# Patient Record
Sex: MALE | Race: WHITE | URBAN - METROPOLITAN AREA
[De-identification: names, ages, dates, MRNs, and addresses within clinical notes are randomized per-mention and may not be internally consistent; named-entity substitution may affect disease eponyms.]

---

## 2018-02-02 ENCOUNTER — OUTPATIENT (OUTPATIENT)
Dept: OUTPATIENT SERVICES | Facility: HOSPITAL | Age: 53
LOS: 1 days | Discharge: HOME | End: 2018-02-02

## 2018-02-02 DIAGNOSIS — Z02.9 ENCOUNTER FOR ADMINISTRATIVE EXAMINATIONS, UNSPECIFIED: ICD-10-CM

## 2018-02-02 DIAGNOSIS — E11.9 TYPE 2 DIABETES MELLITUS WITHOUT COMPLICATIONS: ICD-10-CM

## 2018-02-02 DIAGNOSIS — E78.5 HYPERLIPIDEMIA, UNSPECIFIED: ICD-10-CM

## 2018-02-02 DIAGNOSIS — D64.9 ANEMIA, UNSPECIFIED: ICD-10-CM

## 2018-02-02 DIAGNOSIS — I10 ESSENTIAL (PRIMARY) HYPERTENSION: ICD-10-CM

## 2018-02-02 DIAGNOSIS — E83.52 HYPERCALCEMIA: ICD-10-CM

## 2018-02-07 ENCOUNTER — OUTPATIENT (OUTPATIENT)
Dept: OUTPATIENT SERVICES | Facility: HOSPITAL | Age: 53
LOS: 1 days | Discharge: HOME | End: 2018-02-07

## 2018-02-07 DIAGNOSIS — E78.5 HYPERLIPIDEMIA, UNSPECIFIED: ICD-10-CM

## 2018-02-07 DIAGNOSIS — I10 ESSENTIAL (PRIMARY) HYPERTENSION: ICD-10-CM

## 2018-09-14 ENCOUNTER — OUTPATIENT (OUTPATIENT)
Dept: OUTPATIENT SERVICES | Facility: HOSPITAL | Age: 53
LOS: 1 days | Discharge: HOME | End: 2018-09-14

## 2018-09-14 DIAGNOSIS — I10 ESSENTIAL (PRIMARY) HYPERTENSION: ICD-10-CM

## 2018-09-14 DIAGNOSIS — D64.9 ANEMIA, UNSPECIFIED: ICD-10-CM

## 2018-09-14 DIAGNOSIS — E11.9 TYPE 2 DIABETES MELLITUS WITHOUT COMPLICATIONS: ICD-10-CM

## 2020-08-12 PROBLEM — Z00.00 ENCOUNTER FOR PREVENTIVE HEALTH EXAMINATION: Status: ACTIVE | Noted: 2020-08-12

## 2020-10-23 ENCOUNTER — APPOINTMENT (OUTPATIENT)
Dept: ORTHOPEDIC SURGERY | Facility: CLINIC | Age: 55
End: 2020-10-23
Payer: COMMERCIAL

## 2020-10-23 VITALS — HEIGHT: 73 IN | BODY MASS INDEX: 39.76 KG/M2 | WEIGHT: 300 LBS

## 2020-10-23 DIAGNOSIS — M17.12 UNILATERAL PRIMARY OSTEOARTHRITIS, LEFT KNEE: ICD-10-CM

## 2020-10-23 DIAGNOSIS — Z86.39 PERSONAL HISTORY OF OTHER ENDOCRINE, NUTRITIONAL AND METABOLIC DISEASE: ICD-10-CM

## 2020-10-23 DIAGNOSIS — M17.11 UNILATERAL PRIMARY OSTEOARTHRITIS, RIGHT KNEE: ICD-10-CM

## 2020-10-23 DIAGNOSIS — Z80.0 FAMILY HISTORY OF MALIGNANT NEOPLASM OF DIGESTIVE ORGANS: ICD-10-CM

## 2020-10-23 DIAGNOSIS — Z86.69 PERSONAL HISTORY OF OTHER DISEASES OF THE NERVOUS SYSTEM AND SENSE ORGANS: ICD-10-CM

## 2020-10-23 DIAGNOSIS — Z96.642 PRESENCE OF LEFT ARTIFICIAL HIP JOINT: ICD-10-CM

## 2020-10-23 DIAGNOSIS — M25.551 PAIN IN RIGHT HIP: ICD-10-CM

## 2020-10-23 DIAGNOSIS — M25.561 PAIN IN RIGHT KNEE: ICD-10-CM

## 2020-10-23 DIAGNOSIS — Z86.79 PERSONAL HISTORY OF OTHER DISEASES OF THE CIRCULATORY SYSTEM: ICD-10-CM

## 2020-10-23 PROCEDURE — 99072 ADDL SUPL MATRL&STAF TM PHE: CPT

## 2020-10-23 PROCEDURE — 73564 X-RAY EXAM KNEE 4 OR MORE: CPT | Mod: RT

## 2020-10-23 PROCEDURE — 73521 X-RAY EXAM HIPS BI 2 VIEWS: CPT

## 2020-10-23 PROCEDURE — 99203 OFFICE O/P NEW LOW 30 MIN: CPT

## 2020-10-23 RX ORDER — GLIPIZIDE AND METFORMIN HYDROCHLORIDE 2.5; 5 MG/1; MG/1
2.5-5 TABLET, FILM COATED ORAL
Refills: 0 | Status: ACTIVE | COMMUNITY

## 2020-10-23 RX ORDER — LOSARTAN POTASSIUM 100 MG/1
TABLET, FILM COATED ORAL
Refills: 0 | Status: ACTIVE | COMMUNITY

## 2020-10-23 RX ORDER — CELECOXIB 200 MG/1
200 CAPSULE ORAL
Refills: 0 | Status: ACTIVE | COMMUNITY

## 2020-10-23 RX ORDER — FAMOTIDINE 40 MG/1
40 TABLET, FILM COATED ORAL
Refills: 0 | Status: ACTIVE | COMMUNITY

## 2020-10-23 NOTE — PHYSICAL EXAM
[de-identified] : General appearance: well nourished and hydrated, pleasant, alert and oriented x 3, cooperative.\par Cardiovascular: no apparent abnormalities, no lower leg edema, no varicosities, pedal pulses are palpable.\par Neurologic: sensation is normal, no muscle weakness in upper or lower extremities\par Dermatologic no apparent skin lesions, moist, warm, no rash.\par Gait: nonantalgic.\par \par Left knee\par Inspection: no effusion or erythema.\par Wounds: none.\par Alignment: varus\par Palpation: medial joint line tenderness \par ROM:10 degrees varus, 0-95 degrees \par Ligamentous laxity: all ligaments appear stable\par Muscle Test: good quad strength.\par \par Right knee\par Inspection: no effusion or erythema.\par Wounds: none.\par Alignment: varus\par Palpation: medial joint line tenderness \par ROM: 10 degrees varus, 0-95 degrees \par Ligamentous laxity: all ligaments appear stable\par Muscle Test: good quad strength.\par  [de-identified] : Radiographs done today AP lateral and skyline of both knees shows bone on bone in the medial compartments of both knees with severe varus deformities bilaterally. \par \par Radiographs done today AP pelvis and lateral of both hips shows a well aligned cementless total hip on the left and well maintained joint space on the right.

## 2020-10-23 NOTE — HISTORY OF PRESENT ILLNESS
[de-identified] : 55 year old male s/p left total hip replacement in 2017 with another orthopedic surgeon presents to the office today complaining of right hip pain and bilateral knee pain, right knee more painful than the left. In regard to his left total hip replacement, patient reports doing well. In regard to the right hip, patient reports pain that is sharp in nature. he denies any groin pain and states the pain is in the lower back and radiates down posteriorly. Patient reports having the same pain prior to having his left hip replaced. In regard to the knees, patient reports right knee pain that is sharp in nature. He reports swelling, buckling, clicking, and a loss of motion. Patient reports only being able to ambulate about 2 blocks. He reports pain and difficulty with negotiating stairs, worse with ascending. He also notes increased difficulty putting socks and shoes on and standing from a seated position. Patient reports taking Celebrex 200 mg with only minimal relief. He has received gel injections in the past in the bilateral knees several years ago, but denies any relief. Patient is here today to discuss his options for pain relief.

## 2020-10-23 NOTE — ASSESSMENT
[FreeTextEntry1] : 55 year old male with a BMI of 40, s/p LTH with Dr Youssef 2017. He presents for bilateral knee pain (right worse than left) and right hip pain. His hip radiographs show minimal arthritis but he has severe varus deformities and severe arthritis of both knees. The pt desires elective bilateral total knee replacements. His mother is demented and non ambulatory and he is her only care giver, he has no family to help him. For now he will just continue on Celebrex. I feel his arthritis is too severe to warrant injections. He was also counseled on weight loss. He will f/u PRN

## 2021-02-25 ENCOUNTER — APPOINTMENT (OUTPATIENT)
Dept: OTOLARYNGOLOGY | Facility: CLINIC | Age: 56
End: 2021-02-25

## 2022-07-24 ENCOUNTER — NON-APPOINTMENT (OUTPATIENT)
Age: 57
End: 2022-07-24

## 2022-11-27 ENCOUNTER — NON-APPOINTMENT (OUTPATIENT)
Age: 57
End: 2022-11-27

## 2023-12-21 ENCOUNTER — EMERGENCY (EMERGENCY)
Facility: HOSPITAL | Age: 58
LOS: 0 days | Discharge: LEFT BEFORE DISCHARGE | End: 2023-12-21
Attending: STUDENT IN AN ORGANIZED HEALTH CARE EDUCATION/TRAINING PROGRAM
Payer: COMMERCIAL

## 2023-12-21 VITALS
WEIGHT: 274.92 LBS | TEMPERATURE: 98 F | DIASTOLIC BLOOD PRESSURE: 66 MMHG | HEART RATE: 102 BPM | RESPIRATION RATE: 18 BRPM | SYSTOLIC BLOOD PRESSURE: 112 MMHG | OXYGEN SATURATION: 94 %

## 2023-12-21 DIAGNOSIS — E78.5 HYPERLIPIDEMIA, UNSPECIFIED: ICD-10-CM

## 2023-12-21 DIAGNOSIS — E11.9 TYPE 2 DIABETES MELLITUS WITHOUT COMPLICATIONS: ICD-10-CM

## 2023-12-21 DIAGNOSIS — Z23 ENCOUNTER FOR IMMUNIZATION: ICD-10-CM
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
## 2023-12-21 DIAGNOSIS — I10 ESSENTIAL (PRIMARY) HYPERTENSION: ICD-10-CM
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
## 2023-12-21 DIAGNOSIS — S01.01XA LACERATION WITHOUT FOREIGN BODY OF SCALP, INITIAL ENCOUNTER: ICD-10-CM

## 2023-12-21 DIAGNOSIS — Z53.29 PROCEDURE AND TREATMENT NOT CARRIED OUT BECAUSE OF PATIENT'S DECISION FOR OTHER REASONS: ICD-10-CM

## 2023-12-21 LAB
ALBUMIN SERPL ELPH-MCNC: 4.4 G/DL — SIGNIFICANT CHANGE UP (ref 3.5–5.2)
ALBUMIN SERPL ELPH-MCNC: 4.4 G/DL — SIGNIFICANT CHANGE UP (ref 3.5–5.2)
ALP SERPL-CCNC: 79 U/L — SIGNIFICANT CHANGE UP (ref 30–115)
ALP SERPL-CCNC: 79 U/L — SIGNIFICANT CHANGE UP (ref 30–115)
ALT FLD-CCNC: 16 U/L — SIGNIFICANT CHANGE UP (ref 0–41)
ALT FLD-CCNC: 16 U/L — SIGNIFICANT CHANGE UP (ref 0–41)
ANION GAP SERPL CALC-SCNC: 16 MMOL/L — HIGH (ref 7–14)
ANION GAP SERPL CALC-SCNC: 16 MMOL/L — HIGH (ref 7–14)
AST SERPL-CCNC: 16 U/L — SIGNIFICANT CHANGE UP (ref 0–41)
AST SERPL-CCNC: 16 U/L — SIGNIFICANT CHANGE UP (ref 0–41)
BASOPHILS # BLD AUTO: 0.05 K/UL — SIGNIFICANT CHANGE UP (ref 0–0.2)
BASOPHILS # BLD AUTO: 0.05 K/UL — SIGNIFICANT CHANGE UP (ref 0–0.2)
BASOPHILS NFR BLD AUTO: 0.5 % — SIGNIFICANT CHANGE UP (ref 0–1)
BASOPHILS NFR BLD AUTO: 0.5 % — SIGNIFICANT CHANGE UP (ref 0–1)
BILIRUB SERPL-MCNC: 0.3 MG/DL — SIGNIFICANT CHANGE UP (ref 0.2–1.2)
BILIRUB SERPL-MCNC: 0.3 MG/DL — SIGNIFICANT CHANGE UP (ref 0.2–1.2)
BUN SERPL-MCNC: 15 MG/DL — SIGNIFICANT CHANGE UP (ref 10–20)
BUN SERPL-MCNC: 15 MG/DL — SIGNIFICANT CHANGE UP (ref 10–20)
CALCIUM SERPL-MCNC: 9.8 MG/DL — SIGNIFICANT CHANGE UP (ref 8.4–10.5)
CALCIUM SERPL-MCNC: 9.8 MG/DL — SIGNIFICANT CHANGE UP (ref 8.4–10.5)
CHLORIDE SERPL-SCNC: 96 MMOL/L — LOW (ref 98–110)
CHLORIDE SERPL-SCNC: 96 MMOL/L — LOW (ref 98–110)
CO2 SERPL-SCNC: 21 MMOL/L — SIGNIFICANT CHANGE UP (ref 17–32)
CO2 SERPL-SCNC: 21 MMOL/L — SIGNIFICANT CHANGE UP (ref 17–32)
CREAT SERPL-MCNC: 0.7 MG/DL — SIGNIFICANT CHANGE UP (ref 0.7–1.5)
CREAT SERPL-MCNC: 0.7 MG/DL — SIGNIFICANT CHANGE UP (ref 0.7–1.5)
EGFR: 105 ML/MIN/1.73M2 — SIGNIFICANT CHANGE UP
EGFR: 105 ML/MIN/1.73M2 — SIGNIFICANT CHANGE UP
EOSINOPHIL # BLD AUTO: 0.31 K/UL — SIGNIFICANT CHANGE UP (ref 0–0.7)
EOSINOPHIL # BLD AUTO: 0.31 K/UL — SIGNIFICANT CHANGE UP (ref 0–0.7)
EOSINOPHIL NFR BLD AUTO: 3 % — SIGNIFICANT CHANGE UP (ref 0–8)
EOSINOPHIL NFR BLD AUTO: 3 % — SIGNIFICANT CHANGE UP (ref 0–8)
GLUCOSE SERPL-MCNC: 154 MG/DL — HIGH (ref 70–99)
GLUCOSE SERPL-MCNC: 154 MG/DL — HIGH (ref 70–99)
HCT VFR BLD CALC: 45.4 % — SIGNIFICANT CHANGE UP (ref 42–52)
HCT VFR BLD CALC: 45.4 % — SIGNIFICANT CHANGE UP (ref 42–52)
HGB BLD-MCNC: 15.6 G/DL — SIGNIFICANT CHANGE UP (ref 14–18)
HGB BLD-MCNC: 15.6 G/DL — SIGNIFICANT CHANGE UP (ref 14–18)
IMM GRANULOCYTES NFR BLD AUTO: 1 % — HIGH (ref 0.1–0.3)
IMM GRANULOCYTES NFR BLD AUTO: 1 % — HIGH (ref 0.1–0.3)
LACTATE SERPL-SCNC: 2.1 MMOL/L — HIGH (ref 0.7–2)
LACTATE SERPL-SCNC: 2.1 MMOL/L — HIGH (ref 0.7–2)
LIDOCAIN IGE QN: 53 U/L — SIGNIFICANT CHANGE UP (ref 7–60)
LIDOCAIN IGE QN: 53 U/L — SIGNIFICANT CHANGE UP (ref 7–60)
LYMPHOCYTES # BLD AUTO: 3.23 K/UL — SIGNIFICANT CHANGE UP (ref 1.2–3.4)
LYMPHOCYTES # BLD AUTO: 3.23 K/UL — SIGNIFICANT CHANGE UP (ref 1.2–3.4)
LYMPHOCYTES # BLD AUTO: 30.8 % — SIGNIFICANT CHANGE UP (ref 20.5–51.1)
LYMPHOCYTES # BLD AUTO: 30.8 % — SIGNIFICANT CHANGE UP (ref 20.5–51.1)
MCHC RBC-ENTMCNC: 31.3 PG — HIGH (ref 27–31)
MCHC RBC-ENTMCNC: 31.3 PG — HIGH (ref 27–31)
MCHC RBC-ENTMCNC: 34.4 G/DL — SIGNIFICANT CHANGE UP (ref 32–37)
MCHC RBC-ENTMCNC: 34.4 G/DL — SIGNIFICANT CHANGE UP (ref 32–37)
MCV RBC AUTO: 91.2 FL — SIGNIFICANT CHANGE UP (ref 80–94)
MCV RBC AUTO: 91.2 FL — SIGNIFICANT CHANGE UP (ref 80–94)
MONOCYTES # BLD AUTO: 0.78 K/UL — HIGH (ref 0.1–0.6)
MONOCYTES # BLD AUTO: 0.78 K/UL — HIGH (ref 0.1–0.6)
MONOCYTES NFR BLD AUTO: 7.4 % — SIGNIFICANT CHANGE UP (ref 1.7–9.3)
MONOCYTES NFR BLD AUTO: 7.4 % — SIGNIFICANT CHANGE UP (ref 1.7–9.3)
NEUTROPHILS # BLD AUTO: 6.02 K/UL — SIGNIFICANT CHANGE UP (ref 1.4–6.5)
NEUTROPHILS # BLD AUTO: 6.02 K/UL — SIGNIFICANT CHANGE UP (ref 1.4–6.5)
NEUTROPHILS NFR BLD AUTO: 57.3 % — SIGNIFICANT CHANGE UP (ref 42.2–75.2)
NEUTROPHILS NFR BLD AUTO: 57.3 % — SIGNIFICANT CHANGE UP (ref 42.2–75.2)
NRBC # BLD: 0 /100 WBCS — SIGNIFICANT CHANGE UP (ref 0–0)
NRBC # BLD: 0 /100 WBCS — SIGNIFICANT CHANGE UP (ref 0–0)
PLATELET # BLD AUTO: 257 K/UL — SIGNIFICANT CHANGE UP (ref 130–400)
PLATELET # BLD AUTO: 257 K/UL — SIGNIFICANT CHANGE UP (ref 130–400)
PMV BLD: 9.9 FL — SIGNIFICANT CHANGE UP (ref 7.4–10.4)
PMV BLD: 9.9 FL — SIGNIFICANT CHANGE UP (ref 7.4–10.4)
POTASSIUM SERPL-MCNC: 4.3 MMOL/L — SIGNIFICANT CHANGE UP (ref 3.5–5)
POTASSIUM SERPL-MCNC: 4.3 MMOL/L — SIGNIFICANT CHANGE UP (ref 3.5–5)
POTASSIUM SERPL-SCNC: 4.3 MMOL/L — SIGNIFICANT CHANGE UP (ref 3.5–5)
POTASSIUM SERPL-SCNC: 4.3 MMOL/L — SIGNIFICANT CHANGE UP (ref 3.5–5)
PROT SERPL-MCNC: 7.2 G/DL — SIGNIFICANT CHANGE UP (ref 6–8)
PROT SERPL-MCNC: 7.2 G/DL — SIGNIFICANT CHANGE UP (ref 6–8)
RBC # BLD: 4.98 M/UL — SIGNIFICANT CHANGE UP (ref 4.7–6.1)
RBC # BLD: 4.98 M/UL — SIGNIFICANT CHANGE UP (ref 4.7–6.1)
RBC # FLD: 13 % — SIGNIFICANT CHANGE UP (ref 11.5–14.5)
RBC # FLD: 13 % — SIGNIFICANT CHANGE UP (ref 11.5–14.5)
SODIUM SERPL-SCNC: 133 MMOL/L — LOW (ref 135–146)
SODIUM SERPL-SCNC: 133 MMOL/L — LOW (ref 135–146)
WBC # BLD: 10.49 K/UL — SIGNIFICANT CHANGE UP (ref 4.8–10.8)
WBC # BLD: 10.49 K/UL — SIGNIFICANT CHANGE UP (ref 4.8–10.8)
WBC # FLD AUTO: 10.49 K/UL — SIGNIFICANT CHANGE UP (ref 4.8–10.8)
WBC # FLD AUTO: 10.49 K/UL — SIGNIFICANT CHANGE UP (ref 4.8–10.8)

## 2023-12-21 PROCEDURE — 80053 COMPREHEN METABOLIC PANEL: CPT

## 2023-12-21 PROCEDURE — 12001 RPR S/N/AX/GEN/TRNK 2.5CM/<: CPT

## 2023-12-21 PROCEDURE — 72170 X-RAY EXAM OF PELVIS: CPT

## 2023-12-21 PROCEDURE — 71045 X-RAY EXAM CHEST 1 VIEW: CPT | Mod: 26

## 2023-12-21 PROCEDURE — 90715 TDAP VACCINE 7 YRS/> IM: CPT

## 2023-12-21 PROCEDURE — 72125 CT NECK SPINE W/O DYE: CPT | Mod: MA

## 2023-12-21 PROCEDURE — 36415 COLL VENOUS BLD VENIPUNCTURE: CPT

## 2023-12-21 PROCEDURE — 72125 CT NECK SPINE W/O DYE: CPT | Mod: 26,MA

## 2023-12-21 PROCEDURE — 72170 X-RAY EXAM OF PELVIS: CPT | Mod: 26

## 2023-12-21 PROCEDURE — 99284 EMERGENCY DEPT VISIT MOD MDM: CPT | Mod: 25

## 2023-12-21 PROCEDURE — 83690 ASSAY OF LIPASE: CPT

## 2023-12-21 PROCEDURE — 70450 CT HEAD/BRAIN W/O DYE: CPT | Mod: MA

## 2023-12-21 PROCEDURE — 71260 CT THORAX DX C+: CPT | Mod: MA

## 2023-12-21 PROCEDURE — 74177 CT ABD & PELVIS W/CONTRAST: CPT | Mod: MA

## 2023-12-21 PROCEDURE — 82962 GLUCOSE BLOOD TEST: CPT

## 2023-12-21 PROCEDURE — 71260 CT THORAX DX C+: CPT | Mod: 26,MA

## 2023-12-21 PROCEDURE — 70450 CT HEAD/BRAIN W/O DYE: CPT | Mod: 26,MA

## 2023-12-21 PROCEDURE — 74177 CT ABD & PELVIS W/CONTRAST: CPT | Mod: 26,MA

## 2023-12-21 PROCEDURE — 90471 IMMUNIZATION ADMIN: CPT

## 2023-12-21 PROCEDURE — 71045 X-RAY EXAM CHEST 1 VIEW: CPT

## 2023-12-21 PROCEDURE — 85025 COMPLETE CBC W/AUTO DIFF WBC: CPT

## 2023-12-21 PROCEDURE — 99285 EMERGENCY DEPT VISIT HI MDM: CPT | Mod: 25

## 2023-12-21 PROCEDURE — 83605 ASSAY OF LACTIC ACID: CPT

## 2023-12-21 RX ORDER — TETANUS TOXOID, REDUCED DIPHTHERIA TOXOID AND ACELLULAR PERTUSSIS VACCINE, ADSORBED 5; 2.5; 8; 8; 2.5 [IU]/.5ML; [IU]/.5ML; UG/.5ML; UG/.5ML; UG/.5ML
0.5 SUSPENSION INTRAMUSCULAR ONCE
Refills: 0 | Status: COMPLETED | OUTPATIENT
Start: 2023-12-21 | End: 2023-12-21

## 2023-12-21 RX ORDER — ACETAMINOPHEN 500 MG
650 TABLET ORAL ONCE
Refills: 0 | Status: COMPLETED | OUTPATIENT
Start: 2023-12-21 | End: 2023-12-21

## 2023-12-21 RX ADMIN — TETANUS TOXOID, REDUCED DIPHTHERIA TOXOID AND ACELLULAR PERTUSSIS VACCINE, ADSORBED 0.5 MILLILITER(S): 5; 2.5; 8; 8; 2.5 SUSPENSION INTRAMUSCULAR at 06:14

## 2023-12-21 RX ADMIN — Medication 650 MILLIGRAM(S): at 06:15

## 2023-12-21 NOTE — ED ADULT TRIAGE NOTE - CHIEF COMPLAINT QUOTE
pt complains of head injury s/p mechanical trip and fall down 4-5 wooden steps, pt states he thinks he lost conciousness for a few seconds, denies blood thinners

## 2023-12-21 NOTE — ED PROVIDER NOTE - PROGRESS NOTE DETAILS
58-year-old male who presents status post fall unknown LOC small laceration to the left  parietal area.  Imaging labs pain management Tdap reassess dispo pending ER: pt signed out to Dr. Flor Authored by Maria C Flor, DO: Patient aggressive and verbally abusive towards staff. Patient unable to be de-escalated, ripped out his IV and walked out of the Emergency Room.    Prior to this, patient updated will all available results. Aware of infrarenal aneurysm (ct chest and abdomen pelvis results only back at this time), still pending rest of workup. 2 staples placed and patient advised to return in 7 days for removal.

## 2023-12-21 NOTE — ED ADULT TRIAGE NOTE - ISOLATION TYPE:
None home w/ home PT/home w/ assist/pending course/improvement during inpatient stay, pending stair training.  Pt with multiple co-morbidities

## 2023-12-21 NOTE — ED PROVIDER NOTE - PHYSICAL EXAMINATION
VITAL SIGNS: I have reviewed nursing notes and confirm.  CONSTITUTIONAL: non-toxic, well appearing  SKIN: no rash, no petechiae.  EYES:  EOMI, pink conjunctiva, anicteric  ENT: tongue midline, no exudates, MMM. there is a 1-2 cm laceration noted to the R occipital area, no active bleeding.   NECK: Supple; no meningismus, no JVD  CARD: RRR, no murmurs, equal radial pulses bilaterally 2+  RESP: CTAB, no respiratory distress  ABD: Soft, non-tender, non-distended, no peritoneal signs, no HSM, no CVA tenderness  EXT: Normal ROM x4. No edema. No calves tenderness  NEURO: Alert, oriented x3. CN2-12 intact, equal strength bilaterally, nl gait.  PSYCH: Cooperative, appropriate.

## 2023-12-21 NOTE — ED PROCEDURE NOTE - CPROC ED TIME OUT STATEMENT1
“Patient's name, , procedure and correct site were confirmed during the [place] Timeout.” “Patient's name, , procedure and correct site were confirmed during the [place] Timeout.”

## 2023-12-21 NOTE — ED PROVIDER NOTE - NS ED ATTENDING STATEMENT MOD
You are seen after your exposure to antifreeze.  I discussed the case with [place] poison control who stated that it is highly unlikely he would receive a toxic exposure from your splash so he can rest assured.  This likely provoked some anxiety as your work-up is otherwise reassuring.  On to follow-up with your primary care provider as an outpatient if any of her symptoms worsen prior please return to the emergency department immediately.    Your chest x-ray will be read later today by radiologist I do not see any pneumonia but I will follow-up the radiology read and I will call you if there is any need for any antibiotic prescriptions but I will not call you if it is negative and I expect it to be read as negative as I do not see any abnormalities.   Attending Only

## 2023-12-21 NOTE — ED PROVIDER NOTE - OBJECTIVE STATEMENT
58-year-old male with a past medical history significant for diabetes hyperlipidemia hypertension who presents status post fall.  Patient states that he was walking down a flight of stairs at his home and had a mechanical fall and tripped.  Patient did hit his head and is unsure if there was any LOC.  Patient denies any nausea vomiting change in vision or any other medical complaints.  Of note patient not on any anticoagulation.

## 2023-12-21 NOTE — ED PROCEDURE NOTE - ATTENDING CONTRIBUTION TO CARE
I was present for and supervised the key/critical aspects of the procedures performed during the care of the patient. 2.885

## 2023-12-21 NOTE — ED PROVIDER NOTE - CLINICAL SUMMARY MEDICAL DECISION MAKING FREE TEXT BOX
patient signed out to me from Dr. White. 57 yo M presented to ED s/p mechanical fall down steps this morning +HT no reported LOC. Labs were ordered and reviewed.  Imaging was ordered and reviewed by me. Pending CT head and neck results prior to patient eloping (see progress note for more details.).  Appropriate medications for patient's presenting complaints were ordered and effects were reassessed.  Patient's records (prior hospital, ED visit, and/or nursing home notes if available) were reviewed.  Additional history was obtained from EMS, family, and/or PCP (where available).  Escalation to admission/observation was considered. However patient eloped from ED. patient signed out to me from Dr. White. 59 yo M presented to ED s/p mechanical fall down steps this morning +HT no reported LOC. Labs were ordered and reviewed.  Imaging was ordered and reviewed by me. Pending CT head and neck results prior to patient eloping (see progress note for more details.).  Appropriate medications for patient's presenting complaints were ordered and effects were reassessed.  Patient's records (prior hospital, ED visit, and/or nursing home notes if available) were reviewed.  Additional history was obtained from EMS, family, and/or PCP (where available).  Escalation to admission/observation was considered. However patient eloped from ED.

## 2023-12-21 NOTE — ED ADULT NURSE NOTE - NSFALLRISKINTERV_ED_ALL_ED
Communicate fall risk and risk factors to all staff, patient, and family/Provide visual cue: yellow wristband, yellow gown, etc/Reinforce activity limits and safety measures with patient and family/Call bell, personal items and telephone in reach/Instruct patient to call for assistance before getting out of bed/chair/stretcher/Non-slip footwear applied when patient is off stretcher/[place] to call system/Physically safe environment - no spills, clutter or unnecessary equipment/Purposeful Proactive Rounding/Room/bathroom lighting operational, light cord in reach Communicate fall risk and risk factors to all staff, patient, and family/Provide visual cue: yellow wristband, yellow gown, etc/Reinforce activity limits and safety measures with patient and family/Call bell, personal items and telephone in reach/Instruct patient to call for assistance before getting out of bed/chair/stretcher/Non-slip footwear applied when patient is off stretcher/[place] to call system/Physically safe environment - no spills, clutter or unnecessary equipment/Purposeful Proactive Rounding/Room/bathroom lighting operational, light cord in reach

## 2023-12-28 ENCOUNTER — EMERGENCY (EMERGENCY)
Facility: HOSPITAL | Age: 58
LOS: 0 days | Discharge: HOME | End: 2023-12-28
Attending: EMERGENCY MEDICINE
Payer: COMMERCIAL

## 2023-12-28 VITALS
DIASTOLIC BLOOD PRESSURE: 73 MMHG | RESPIRATION RATE: 16 BRPM | TEMPERATURE: 99 F | HEART RATE: 97 BPM | SYSTOLIC BLOOD PRESSURE: 152 MMHG | OXYGEN SATURATION: 98 %

## 2023-12-28 DIAGNOSIS — S01.01XD LACERATION WITHOUT FOREIGN BODY OF SCALP, SUBSEQUENT ENCOUNTER: ICD-10-CM

## 2023-12-28 PROCEDURE — 99212 OFFICE O/P EST SF 10 MIN: CPT

## 2023-12-28 PROCEDURE — L9995: CPT

## 2023-12-28 NOTE — ED PROVIDER NOTE - PHYSICAL EXAMINATION
_  CONSTITUTIONAL: In no acute distress  SKIN: Warm, dry; +well-healed laceration on the left parietal scalp with 2 staples in place  HEAD: NCAT  EYES: Clear conjunctiva   ENT: Moist mucous membranes  NECK: Supple  CARD: Regular rate and rhythm, no cyanosis  ABD: Soft, nontender  EXT: Pulses palpable distally  NEURO: Awake, alert  PSYCH: Cooperative, appropriate

## 2023-12-28 NOTE — ED PROVIDER NOTE - PROGRESS NOTE DETAILS
Resident DAVON Chambers: [place] removed. Patient asking for lab results from last visit, as he left prior to receiving discharge paperwork - copies provided. Resident DAVON Chambers: [place] removed. Patient asking for lab results from last visit, as he left prior to receiving discharge paperwork - copies provided.

## 2023-12-28 NOTE — ED PROVIDER NOTE - ATTENDING CONTRIBUTION TO CARE
58-year-old male PMH DM, HTN, HLD presenting for staple removal from his scalp placed on 12/21/2023.  He denies any complaints.  Patient appears well, wound is healing well, no signs of infection.  Staples were removed without complications.  Vital signs were reviewed, wound care discussed.  Patient stable for discharge home, he verbalized understanding is amenable to discharge plan.

## 2023-12-28 NOTE — ED PROVIDER NOTE - PATIENT PORTAL LINK FT
You can access the FollowMyHealth Patient Portal offered by [facility] by registering at the following website: [email or web address]. By joining [employer]’s FollowMyHealth portal, you will also be able to view your health information using other applications (apps) compatible with our system. You can access the FollowMyHealth Patient Portal offered by [facility] by registering at the following website: [email or web address]. By joining [employer]’s FollowMyHealth portal, you will also be able to view your health information using other applications (apps) compatible with our system.

## 2023-12-28 NOTE — ED PROVIDER NOTE - OBJECTIVE STATEMENT
08/17/19 0755   PRE-TX-O2   O2 Device (Oxygen Therapy) room air   SpO2 99 %   Pulse Oximetry Type Intermittent   $ Pulse Oximetry - Multiple Charge Pulse Oximetry - Multiple      Patient is 58-year-old man presenting to the ED for staple removal.  Patient sustained a fall 1 week ago, and had a laceration on his scalp which was repaired with 2 staples.  The laceration has since healed, and patient has no acute complaints.  Patient asking for lab results from last visit, as he left prior to receiving discharge paperwork. Patient is 58-year-old man presenting to the ED for staple removal.  Patient sustained a fall 1 week ago, and had a laceration on his scalp which was repaired with 2 staples.  The laceration has since healed, and patient has no acute complaints.

## 2023-12-28 NOTE — ED ADULT NURSE NOTE - NSFALLUNIVINTERV_ED_ALL_ED
Bed/Stretcher in lowest position, wheels locked, appropriate side rails in place/Call bell, personal items and telephone in reach/Instruct patient to call for assistance before getting out of bed/chair/stretcher/Non-slip footwear applied when patient is off stretcher/[place] to call system/Physically safe environment - no spills, clutter or unnecessary equipment/Purposeful proactive rounding/Room/bathroom lighting operational, light cord in reach Bed/Stretcher in lowest position, wheels locked, appropriate side rails in place/Call bell, personal items and telephone in reach/Instruct patient to call for assistance before getting out of bed/chair/stretcher/Non-slip footwear applied when patient is off stretcher/[place] to call system/Physically safe environment - no spills, clutter or unnecessary equipment/Purposeful proactive rounding/Room/bathroom lighting operational, light cord in reach

## 2024-01-22 NOTE — ED PROCEDURE NOTE - CPROC ED PHYSICIAN PRESENCE1
Freda SMILEY- This patient and saw the pictures.  I am wondering if this could be vasculopathic and I have advised him to take an aspirin.  Not sure about his circulation and advised her quit smoking also. Type 1 diabetic with poor control and trying to get him on with a Dexcom device and also probably insulin pump.  Dr. Anisha ODONNELL
I was present during the key portion of the procedure.

## 2024-02-05 ENCOUNTER — NON-APPOINTMENT (OUTPATIENT)
Age: 59
End: 2024-02-05

## 2024-02-11 ENCOUNTER — NON-APPOINTMENT (OUTPATIENT)
Age: 59
End: 2024-02-11

## 2024-12-30 NOTE — ED PROCEDURE NOTE - CPROC ED DIRECTIONAL
EXAM note  History    Vaughn Sanchez is a 74 year old male  Last seen 6/28/24 for MWV and nonfast visit, here for fasting visit and yearly labs etc  Today feeling good    HTN essential hx, on no med  Hyperlipid on atorvastatin and started OTC fish oil  Hyperglycemia mild: 12/23 a1c=5.6 from 5.9 from 5.8 from 5.7 from 5.5 from 5.6 from 6.2 from 5.8 from prior 5.7  Overwt borderline obese  6/2016 Dx CLL, chemo finished 6/18; Heme Onc Dr Cuadra 12/2020 no recurrence  Derm Dr Pérez following 6 monthly, R shoulder basal cell CA removed 9/6/18, and BCCa L low back 4/19  ENT Dr Boss and ID Dr Puckett \"said nothing more to do\", hearing aids  Infrequent anxiety, valium none at all lately; naproxen infrequently for DJD  BPH sx, dribbles at end no Rx wanted yet  6/20/23 noted playing tennis with grandkids got plantar fasciitis L foot, and sprained R foot.  Took advil a couple, iced, helped   Has O negative blood, used to donate blood    I have reviewed the past medical, family and social history sections including the medications and allergies listed in the above medical record as well as the nursing notes.   Past Medical History:   Diagnosis Date    Anxiety     valium infrequent use    BPH with obstruction/lower urinary tract symptoms     psa=0.83  3/12    Bradycardia     chronic thru years    Cellulitis, neck     admit 3/16 for R neck cellulitis/folliculitis with swelling; seen by ID and Surgery, abx given    CLL (chronic lymphocytic leukemia)  (CMD)     CLL stage 1 dx 2016; H-O Dr Mendoza follows 3 monthly labs, 6 monthly visits    Constipation, unspecified     Dizziness     hx occasional; extensive w/u by Dr Guzman neurology 2008 lumbar puncture, MRI brain unchanged from MRI brain 2004, MRA circulation brain, and [place] [facility] incl 5/10    Fibroma     buccal mucosa, ENT Dr Santillan removed fibroma buccal mucosa L side 4/26/10    Follow-up exam     H/O cardiac catheterization     5/04 normal cath    H/O colonoscopy     2/17 benign,  next due 01e=[id number]; prior 2007 benign, GI Dr Hogue, IFOBT=NEGATIVE for blood 6/3/15 at outside check    H/O complete eye exam     10/12 benign, contacts    History of anemia     borderline; was iron deficient 12/13; told was anemic 2013 on blood donation attempts, has donating for years    History of iron deficiency     12/13 Fe deficient, borderline Hgb/Hct; chronic blood donator;  Fe normalized 12/14    Hyperglycemia     a1c=5.7%  3/12 WNL    Hyperlipidemia, unspecified     Hypertension, essential     Need for hepatitis C screening test     NEG 9/11/15    Need for influenza vaccination     fluvax 9/14/17    Need for pneumococcal vaccine     [id number] 7/15/16, pvax 12/13    Need for Td vaccine     Td 9/15/14    Need for zoster vaccine     zvax 1/14    Osteoarthritis of multiple joints     bilat, R knee meniscus arthroscopic op 1999, L knee in 2002, Dr Ramirez    Overweight (BMI 25.0-29.9)     5'7\"    Presbycusis     ENT Dr Santillan evaluated 4/10, some tinnitus that decreased with decreasing diltiazem    Tinnitus     ENT Dr Tere cam'jil ~2006, Dr Santillan 2010     Past Surgical History:   Procedure Laterality Date    Colonoscopy  02/27/2017    Dr Hogue - mild diverticulosis in simoid, otherwise normal to cecum - 10 yr recall    Knee scope,diagnostic  01/01/1999; 2002    R 1999, L 2002 arthroscopic meniscus ops Dr Ramirez    Ptca      Service to gastroenterology       Current Outpatient Medications   Medication Sig Dispense Refill    atorvastatin (LIPITOR) 40 MG tablet TAKE 1 TABLET BY MOUTH  DAILY 90 tablet 3    Multiple Vitamins-Minerals (MULTIVITAMIN PO) 1 tablet daily.       Psyllium (METAMUCIL PO) once a week as needed.        No current facility-administered medications for this visit.     ALLERGIES:   Allergen Reactions    Lisinopril Cough     Social History     Socioeconomic History    Marital status: /Civil Union     Spouse name: Not on file    Number of children: Not on file    Years of education: Not on file     Highest education level: Not on file   Occupational History    Occupation: retired     Comment: from [employer]   Tobacco Use    Smoking status: Never     Passive exposure: Past    Smokeless tobacco: Never   Substance and Sexual Activity    Alcohol use: No     Alcohol/week: 0.0 standard drinks of alcohol    Drug use: No    Sexual activity: Yes     Partners: Female     Comment:    Other Topics Concern     Service Not Asked    Blood Transfusions Not Asked    Caffeine Concern Not Asked    Occupational Exposure Not Asked    Hobby Hazards Not Asked    Sleep Concern Not Asked    Stress Concern Not Asked    Weight Concern Not Asked    Special Diet Not Asked    Back Care Not Asked    Exercise Not Asked    Bike Helmet Not Asked    Seat Belt Yes    Self-Exams Not Asked   Social History Narrative    Not on file     Social Determinants of Health     Financial Resource Strain: Not on file   Food Insecurity: Low Risk  (2024)    Food Insecurity     Worried about Food: Never true     Food is Gone: Never true   Transportation Needs: Not At Risk (2024)    Transportation Needs     Lack of Reliable Transportation: No   Physical Activity: Not on file   Stress: Not on file   Social Connections: Not on file   Interpersonal Safety: Not on file (2023)     Family History   Problem Relation Age of Onset    Heart Father          71 MI, onset age 50's, smoker    Stroke Mother          94 CVA, Alzheimer's, overwt    Dementia/Alzheimers Mother     Heart Brother          60 MI, onset 60     Review of systems    Constitutional:  Patient denies fever, chills, tiredness or malaise.    HENT:  Denies sinus problems, nasal congestion or sore throat.  Eyes:  Denies change in visual acuity, pain, burning, itching, or discharge  Respiratory:  Denies shortness of breath or wheeze.  Occase nonproductive cough  Cardiovascular:  Denies chest pain, palpitations, edema.    GI:  Denies abdominal pain, nausea,  vomiting, bloody stools, melena or diarrhea.    :  Denies urine retention, painful urination, blood in urine.  Gets bph sx, occasional nocturia/mild frequency, dribbling at end of urination.    Musculoskeletal:  Denies back pain, neck pain, occasional joint pains R shoulder, R knee manageable; no leg swelling.     Integument:  Denies rash, itching.    Neurologic:  Denies unusual headache, focal weakness or sensory changes.    Endocrine:  Denies polyuria, polydipsia or temperature intolerance.    Lymphatic:  Denies swollen glands, unusual weight loss.  Immunologic:  Denies hives, seasonal allergies.    All other systems reviewed and negative.      Physical Exam    Visit Vitals  /80 last 132/86 p53   Pulse (!) 50   Temp 98.2 °F (36.8 °C)   Resp 16   Ht 5' 6\" (1.676 m)   Wt 93.6 kg (206 lb 6.4 oz) up 3; last down 0.1   SpO2 98%   BMI 33.31 kg/m²     Body mass index is 33.31 kg/m².   General:  Alert, oriented x 3 well developed pleasant obese by BMI WM in no apparent distress  HEENT:   Pupils equal, round, react to light and accommodation.  Bilateral external ears normal. Hearing aids in. Minimal bilat soft wax. Nasal mucosa unremarkable. Oropharynx moist. No oral exudates.  Neck: No JVD, thyromegaly, lymphadenopathy or bruit.  Normal range of motion. No tenderness. Supple.   Lungs:  Clear to auscultation bilaterally. No respiratory distress. No wheezing or rales. No chest tenderness.    Cardiovascular:  Border shanon regular rate and rhythm, S1, S2 without murmur, rub, S3 or S4/gallop.    Abdominal:  Bowel sounds normal. Soft. No tenderness. Obesity limits sensitivity.  No detected HSM/ascites  Extremities:  No cyanosis, clubbing or edema; good symmetric radial pulses.    Neurologic:  Alert & oriented x 3. Normal motor function. Normal sensory function. Chronic mild to mod [employer] [place] [employer] [place].    Integument:  Warm. Dry. No erythema. No rash.    LABS:  Hemoglobin A1C (%)   Date Value   12/19/2023 5.6     Sodium (mmol/L)   Date  Value   12/26/2023 142     Potassium (mmol/L)   Date Value   12/26/2023 4.2     Chloride (mmol/L)   Date Value   12/26/2023 110     Glucose (mg/dL)   Date Value   12/26/2023 97     Calcium (mg/dL)   Date Value   12/26/2023 9.1     Carbon Dioxide (mmol/L)   Date Value   12/26/2023 27     BUN (mg/dL)   Date Value   12/26/2023 19     Creatinine (mg/dL)   Date Value   12/26/2023 1.15     WBC (K/mcL)   Date Value   12/17/2024 12.0 (H)     RBC (mil/mcL)   Date Value   12/17/2024 4.95     HCT (%)   Date Value   12/17/2024 43.8     HGB (g/dL)   Date Value   12/17/2024 14.0     PLT (K/mcL)   Date Value   12/17/2024 143   12/2013 b50=194, Folate>24  Fe=52, FeSat=11%  3/30/16 F36=833, folate>24  6/1/16 Fe=74, Fesat=23%  12/18/20 Rj=367, FEsat=35%, vitD=41.9  7/21/2174I51=398,  folate>24    Cholesterol (mg/dL)   Date Value   12/26/2023 180     HDL (mg/dL)   Date Value   12/26/2023 45     Triglycerides (mg/dL)   Date Value   12/26/2023 171 (H)     LDL (mg/dL)   Date Value   12/26/2023 101     GPT/ALT (Units/L)   Date Value   12/26/2023 27     TSH (mcUnits/mL)   Date Value   07/21/2021 2.057   No results found for: \"T4FREE\"     Prostate Specific Antigen (ng/mL)   Date Value   12/26/2023 1.43     11/25/22 XR CHEST PA AND LATERAL  HISTORY: chronic cough comes and goes, no blood or phlegm, no fever or SOB.  Nonsmoker.  Annoying to pt.   COMPARISON: 2/8/2019  FINDINGS:  Normal cardiomediastinal silhouette with prominent fat in the cardiophrenic  angles. Osseous structures reveal no acute abnormality. Upper abdomen and  soft tissues are unremarkable. Lungs are clear. Pulmonary vasculature  appears normal. No pleural effusion or pneumothorax. Mild aortic tortuosity  and atherosclerosis.  Mild degenerative changes in thoracic spine.  IMPRESSION: 1.  No new or acute radiographic abnormality in the chest.    Assessment & Plan    1. HTN (hypertension) essential hx: on no Rx continues at target.  Continue low-sodium, exercise, wt loss  Labs:   A1c CMP FCP PSA   2. HLD (hyperlipidemia): on atorvastatin 40.  12/23 XF=520 from 180 from 197 from 242.  HDL, LDL at targets.  Check periodic FCP, LFT on statin   3. Hyperglycemia, screen for DM: 12/19/23 gluc=97 with a1c=5.6 from 5.9 from 5.8 from 5.7 from 5.5 from 5.6 from 6.2 from 5.8 from 5.7 from 5.9 from 5.6 normal in  4/13, we d/w diet, exercise, some wt loss and f/u labs periodic   4. Obesity by BMI:  TSH WNL 7/21 and 9/16.  Recommend low fat/cholest diet, light exercise as tolerated, wt loss    S[facility]AH [facility] by [facility] now on CPAP   5. Bradycardia hx:  Mild, asx. Hx shanon chronic asymptomatic, on CCB; BP fine in past, so stopped diltiazem.  TSH ok 9/16   6. BPV/Dizziness hx: chronic occasional mild '5 seconds or so when I lay down to sleep' .  Happens when turns head/changes position, inner ear/labyrinth origin.   Workups through years incl Neurology Dr. Guzman 2008 MRA, MRI brain 2004 and 2008, LP, ENT Dr Simmons 2021.   Advised hydration, offered refer back to neurology.  7/2021 true vertigo, nystagmus head turned R not L on lowering to bed c/w labyrinthitis vs otolith.  We d/w Epley maneuvers; Rx'd meclizine prn. Wife saw Dr Rizo for similar, had op.  ENT Dr Simmons 9/16/21 note included:  =============================  Impression:  History of recent BPPV probably on the right side but no evidence of active BPPV on exam at this time, not symptomatic over the last week or so.   Bilateral presbycusis, treated with amplification.                                                                                                 Plan:  Did give him some information on the modified Epley maneuver from right to left if symptoms recur, though at this time we will manage expectantly and have him return if symptoms recur going forward.  =============================  7/21 not as bad, saw Neurologist.  2014 dizzy occas with head position changes; extensive w/u neg, thought BPV/ labyrinth; quieted down.  Advil helps him sleep  better.  Similar 2008.   7 Presbycusis stable with tinnitus, having seen Tato Carranza and [facility]; got hearing aids in 9/13, then new ones, uses them occasionally   8 Tinnitus: stable; had evaluations by ENT including Tato Santillan and Tere and Troy who followed periodic   9 DJD (degenerative joint disease) of knee stable, on naproxen prn, history bilateral knee ops years ago.   6/18/21 advised Carpal Tunnel wrist splints nightly; 7/21/21 splints definitely helped keep hands from falling asleep at night; just occasional tingle in hands.    R shoulder advised tylenol, NSAID, ROM exercises; R shoulder probs Ortho Dr Bey 7/27/21 note included the below; inj didn't help much:  =============================  ASSESSMENT:         1. Right shoulder pain, unspecified chronicity    2. Primary osteoarthritis of right shoulder    3. Osteoarthritis of right glenohumeral joint     PLAN:  Discussed treatment options with patient and Dr. Orozco.  Reasonable to consider physical therapy she is first-line treatment or cortisone injection for symptomatic relief.  Patient elects to try injection  today and this is performed for him.  See separate procedure note. Patient tolerated the injection well without significant pain or irritation.  Patient will continue some home exercises and follow-up on an as-needed basis if symptoms return.  Orders Placed This Encounter    XR Shoulder 3 View Right    triamcinolone acetonide (KENALOG-40) 40 MG/ML injection 20 mg   =============================   10 Anxiety: rare use of valium, stable   11 CLL dx 6/16 and low Fe follows Dr Khalif Loaiza Onc, in remission post treatment.  [facility] from [facility] due to C[facility] hx. 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 Chronic lymphocytic leukemia: remains in near complete remission.   -chlorambucil and obinutuzumab 1/22/18. He completed 4 cycles 4/23/18.  -No evidence of relapse.       -Re-eval 6 months.   2. Hypercholesterolemia. On lipitor.   3. Age appropriate health maintenance though primary.  ==============================   12 Fibroma removed by Dr. Santillan in past    13 Skin abnormality hx : sees Derm  Dr Elisa albarran 6 months incl 11/2023 'no cancer, watching a couple spots'.  Basal cell removed 2018, 4/19 BCCa L low back. Hx lesion lower lip, brown lesions got LN2.  Skin itch, comes and goes.  We d/w antihistamine before yardwork and mosquito bite strategies.    14 H/O complete eye exam: yearly benign per pt, contacts    15 H/O cardiac catheterization: normal 2004    16 Hx Anemia, iron deficient hx:  Resolved H/H WNL 6/13 and 12/2021; Fe normalized incl 12/2020.   17 Vit D supplement: 12/2020 Vit D WNL.  Pt started Vit D and MVI; past hx border low Fe 2017.   18 Constipation stable   19 H/O colonoscopy 2/2017 benign, consider 10y recheck  2027   20 BPH with obstruction/lower urinary tract symptoms: 12/23 PSA=1.43 from 2.18 from 1.43 from 1.56, recheck yearly age 50-80   21 Need for pneumococcal vaccine : had [id number] 7/15/16;  pvax23 12/13   22 Need for influenza vaccination had it 9/25/24.  Rec yearly in Fall 23 Need for covid vax:  We d/w COVID19, precautions, vax's, boosters.   Had Pfizer covid vax's 2/25/21+ 3/21/21+ 9/28/21+ 6/10/22+ 9/19/22 not in [facility]+ Pfizer 8/2/23+ Spikevax 12/26/23+ 9/25/24   24 Need for zoster vaccine : had zostavax 1/14.  Had Shingrix 4/25/19, don't see 2nd shot in [facility]   25 Need Td booster: had Boostrix 7/23/23   26 Screen Hep C: NEG 9/11/15 checked once in life per CDC recs.   27  Cough chronic hx, 6/28/24 no c/o.  6/20/23 stable, 'mostly after I eat like cereal' incl 2/2019 CXR ok.  11/25/22 acted up over summer.  Guaifenesin and flonase nasal help.  11/25/22 CXR pa/ lat unremarkable.    28    Greater than 50% of time was spent counseling, coordinating care, reviewing chart labs, etc. on several high potential morbidity active issues.  Several issues are  unstable or not to target.  More than 30 minutes was taken incl majority of note started yesterday not included in Epic timing.  29   F/u 6 months nonfasting satellite visit and MWV  He is conservative politically, voted for [employer], happy he won 2016 trying to improve corruption economy etc in [place], but 12/2020 lost election pt thinks he was cheated out of it and is sad about that and how badly country doing now with inflation, gas prices etc  11/1/19 he kindly gave condolences on my Dad's passing 9/12/19 at 93yo.  Noted his mom passed in her 90's, had dementia etc.  6/2/20 stated [employer] police broke into his house unnanounced at night due to prank call from [place], he was upset about that.  6/20/23 asked on my racing at [employer], we d/w my GTR best  into turn 1 in fastest group, he follows racing, sports.  12/26/23 he's sad about open border situation, politicians not helping [place].  12/32/24 very happy [employer] won election   left